# Patient Record
(demographics unavailable — no encounter records)

---

## 2024-10-28 NOTE — OP
Date of Procedure:  10/28/2024



Surgeon:  Pal Barreto MD



Preoperative Diagnosis:  Incarcerated tender umbilical hernia.



Postoperative Diagnosis:  Incarcerated tender umbilical hernia, intraabdominal adhesions.



Procedure:  Laparoscopic repair of incarcerated tender umbilical hernia with mesh and laparoscopic ly
sis of adhesions.



Estimated Blood Loss:  Less than 20 mL.



Specimens:  Hernia sac and omentum.



Findings:  Incarcerated omentum that has to be ligated since it was not healthy.



Anesthesia:  General plus local.



Implants:  A large Ventralex mesh.



Indications:  This is the case of a 56-year-old, comes to us with an incarcerated umbilical large her
lanny.  The benefits, alternatives, and risks of laparoscopic possible repair with possible mesh were f
ully explained, which include, but not limited to infection, bleeding, damage to adjacent structures,
 anesthesia complication, recurrence, MI, and even death.  He also understands this might not relieve
 any symptoms.  He might need more than one surgical intervention.  He also understands we may have t
o use a mesh in that region, so pros and cons of mesh use were discussed with the patient.  All of th
e questions were answered to his satisfaction, he signed a consent.



Procedure In Detail:  The patient was brought to the operating room, placed in supine position.  Anes
thesia was done without complication.  Abdominal area was prepped and draped in a sterile fashion.  A
 time-out was called.  After that, a curvilinear __________ was made in the infraumbilical region.  I
ncision was carried down until we found the hernia sac.  __________ large hernia sac.  The opening is
 not that big, it is about 5 cm hernia.  At that moment, I proceeded then to open hernia sac, we noti
kar omentum, but it is a large amount of omentum and it cannot be reduced, so between Eugenia clamps we
 proceeded then to suture ligate the omentum and the area extra was removed and sent to the pathologi
st.  After that, after checking for hemostasis, we proceeded then to reduce back the omentum.  There 
were some adhesions intra-abdominally that will be addressed laparoscopically to allow this omentum t
o reduce completely.  At that moment, I proceeded to remove the hernia sac, cleaned its fascial edges
, and put a figure-of-eight #1 Prolene multiple times to approximate the defects.  We tied few of the
m, but not all of them since we would like to put the Madi trocar right in the center and obtain pn
eumoperitoneum.  Once we did that, we put the cameras in and put a 5 mm trocar to the left and right 
abdomen under direct visualization.  From that angle, we were able to __________ the midline and see 
the adhesions present and they were addressed with the help of a ligature device and also partially r
emoved the falciform ligament to allow the mesh to be placed nice and flat against the abdomen.  We n
oticed the fascial edges to be weak enough to require mesh as the reinforcement even after trying to 
approximate that primary, the fascia is not strong enough.  So, once we have adhesions removed, we we
re able to identify the area, obtain a large Ventralex mesh to overlap the area about 3-5 cm.  The me
sh was introduced through the Madi trocar, pulled the straps.  Amdi trocar was removed and the me
sh was secured anteriorly with the help of SorbaFix fixation device.  After that, we removed the stra
ps and further fixed the mesh against the anterior abdominal wall.  After that, we closed the last fe
w Prolene left to proximate the defect making this a water seal and air seal closure.  We inspected t
he mesh once again, __________ and flat against the abdominal wall, the area of the lysis of adhesion
s shows no bleeding, the area of the omentectomy shows no bleeding, no enterotomies.  So under direct
 visualization, we proceeded to deflate the abdomen and remove the trocars.  The subcutaneous tissue,
 closed with 3-0 chromic and skin in a subcuticular fashion with 3-0 chromic and Steri-Strips on top.
  Sponge count and instrument counts were correct.  The patient 

tolerated the procedure well.  The patient was sent to the recovery in stable condition.





JAYDEN/YOANA

DD:  10/28/2024 12:39:11Voice ID:  [id number]

DT:  10/28/2024 22:41:26Report ID:  [id number]

## 2024-10-28 NOTE — DS
Date of Discharge:  10/28/2024



Diagnosis:  Incarcerated tender umbilical hernia, it is about 5 cm.



Procedure:  Laparoscopic repair of an incarcerated umbilical hernia with mesh and laparoscopic lysis 
of adhesions.



Condition:  Stable.



Disposition:  Home.



Activities:  As tolerated.  No heavy lifting.



Discharge Instructions:  Follow up in my office in 1 week.  Call for appointment at [phone].  Keep a
zulema dry until see us in the clinic.  If he has to get it wet, then wait at least 48 hours and then ma
y remove outer dressings except the Steri-Strip and clean with soap and water and then replace cotton
 balls and the gauze.





JAYDEN/YOANA

DD:  10/28/2024 12:39:11Voice ID:  [id number]

DT:  10/28/2024 22:51:22Report ID:  [id number]

## 2024-10-28 NOTE — P.BOP
Preoperative diagnosis: incarcerated tender umbilical hernia 5cm


Postoperative diagnosis: same


Primary procedure: Laparoscopic repair of incarcerated umbilical hernia with 

mesh


Secondary procedure: Laparoscopic lysis of adhesions


Estimated blood loss: <20cc


Specimen: hernia sac, omentum


Findings: incarcerated omentum ligated


Anesthesia: General


Complications: None


Implants: large ventralex


Transferred to: Recovery Room


Condition: Good

## 2024-10-28 NOTE — RAD REPORT
EXAMINATION: TWO VIEW CHEST XR



CLINICAL INDICATION: preop



TECHNIQUE: 2 views of the chest was performed. 



COMPARISON: 4/19/2021



FINDINGS:

The lungs are well inflated and clear. The heart is normal in size. No displaced fractures evident.



IMPRESSION:

 

No acute or significant abnormalities.







Reported By: Isaac Modi 

Electronically Signed:  10/28/2024 9:05 AM

## 2024-10-28 NOTE — EKG
Test Date:    2024-10-28               Test Time:    08:55:33

Technician:   BRIGITTE                                   

                                                     

MEASUREMENT RESULTS:                                       

Intervals:                                           

Rate:         69                                     

KY:           158                                    

QRSD:         92                                     

QT:           402                                    

QTc:          430                                    

Axis:                                                

P:            38                                     

KY:           158                                    

QRS:          -35                                    

T:            12                                     

                                                     

INTERPRETIVE STATEMENTS:                                       

                                                     

Normal sinus rhythm

Left axis deviation

Inferior infarct, age undetermined

Cannot rule out Anterior infarct, age undetermined

Abnormal ECG

Compared to ECG 04/20/2021 01:04:16

Left-axis deviation now present

Myocardial infarct finding now present

Sinus bradycardia no longer present



Electronically Signed On 10-28-24 12:34:03 CDT by Tone Gamboa